# Patient Record
Sex: FEMALE | Race: WHITE | Employment: UNEMPLOYED | URBAN - METROPOLITAN AREA
[De-identification: names, ages, dates, MRNs, and addresses within clinical notes are randomized per-mention and may not be internally consistent; named-entity substitution may affect disease eponyms.]

---

## 2017-01-30 ENCOUNTER — HOSPITAL ENCOUNTER (OUTPATIENT)
Dept: DIABETES SERVICES | Age: 52
Discharge: HOME | End: 2017-01-30
Attending: FAMILY MEDICINE
Payer: COMMERCIAL

## 2017-01-30 DIAGNOSIS — E11.9 DIABETES MELLITUS WITHOUT COMPLICATION (HCC): ICD-10-CM

## 2017-01-30 PROCEDURE — G0109 DIAB MANAGE TRN IND/GROUP: HCPCS | Performed by: DIETITIAN, REGISTERED

## 2017-03-13 ENCOUNTER — HOSPITAL ENCOUNTER (OUTPATIENT)
Dept: DIABETES SERVICES | Age: 52
Discharge: HOME | End: 2017-03-13
Payer: COMMERCIAL

## 2017-03-13 DIAGNOSIS — E11.9 DIABETES MELLITUS WITHOUT COMPLICATION (HCC): ICD-10-CM

## 2017-03-13 PROCEDURE — G0108 DIAB MANAGE TRN  PER INDIV: HCPCS | Performed by: DIETITIAN, REGISTERED

## 2018-08-20 ENCOUNTER — APPOINTMENT (OUTPATIENT)
Dept: PHYSICAL THERAPY | Age: 53
End: 2018-08-20

## 2018-10-30 ENCOUNTER — APPOINTMENT (OUTPATIENT)
Dept: GENERAL RADIOLOGY | Age: 53
End: 2018-10-30
Attending: PHYSICIAN ASSISTANT
Payer: COMMERCIAL

## 2018-10-30 ENCOUNTER — APPOINTMENT (OUTPATIENT)
Dept: CT IMAGING | Age: 53
End: 2018-10-30
Attending: PHYSICIAN ASSISTANT
Payer: COMMERCIAL

## 2018-10-30 ENCOUNTER — HOSPITAL ENCOUNTER (EMERGENCY)
Age: 53
Discharge: HOME | End: 2018-10-30
Attending: EMERGENCY MEDICINE
Payer: COMMERCIAL

## 2018-10-30 VITALS
RESPIRATION RATE: 19 BRPM | HEART RATE: 83 BPM | DIASTOLIC BLOOD PRESSURE: 75 MMHG | TEMPERATURE: 99.1 F | HEIGHT: 62 IN | SYSTOLIC BLOOD PRESSURE: 114 MMHG | OXYGEN SATURATION: 95 % | WEIGHT: 206 LBS | BODY MASS INDEX: 37.91 KG/M2
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
## 2018-10-30 LAB
ANION GAP BLD CALC-SCNC: 17 MMOL/L (ref 10–20)
BUN BLD-MCNC: 16 MG/DL (ref 9–20)
CA-I BLD-MCNC: 1.04 MMOL/L (ref 1.12–1.32)
CHLORIDE BLD-SCNC: 105 MMOL/L (ref 98–107)
CO2 BLD-SCNC: 23 MMOL/L (ref 21–32)
CREAT BLD-MCNC: 0.5 MG/DL (ref 0.6–1.3)
GLUCOSE BLD-MCNC: 133 MG/DL (ref 65–100)
HCT VFR BLD CALC: 38 % (ref 35–47)
POTASSIUM BLD-SCNC: 3.7 MMOL/L (ref 3.5–5.1)
SERVICE CMNT-IMP: ABNORMAL
SODIUM BLD-SCNC: 140 MMOL/L (ref 136–145)

## 2018-10-30 PROCEDURE — 71275 CT ANGIOGRAPHY CHEST: CPT

## 2018-10-30 PROCEDURE — 80047 BASIC METABLC PNL IONIZED CA: CPT

## 2018-10-30 PROCEDURE — 74011636320 HC RX REV CODE- 636/320: Performed by: EMERGENCY MEDICINE

## 2018-10-30 PROCEDURE — 73130 X-RAY EXAM OF HAND: CPT

## 2018-10-30 PROCEDURE — 74011250637 HC RX REV CODE- 250/637: Performed by: PHYSICIAN ASSISTANT

## 2018-10-30 PROCEDURE — 99285 EMERGENCY DEPT VISIT HI MDM: CPT

## 2018-10-30 PROCEDURE — 71046 X-RAY EXAM CHEST 2 VIEWS: CPT

## 2018-10-30 PROCEDURE — 72125 CT NECK SPINE W/O DYE: CPT

## 2018-10-30 RX ORDER — TRAMADOL HYDROCHLORIDE 50 MG/1
50 TABLET ORAL
Status: COMPLETED | OUTPATIENT
Start: 2018-10-30 | End: 2018-10-30

## 2018-10-30 RX ORDER — IBUPROFEN 800 MG/1
800 TABLET ORAL
Qty: 20 TAB | Refills: 0 | Status: SHIPPED | OUTPATIENT
Start: 2018-10-30 | End: 2018-11-06

## 2018-10-30 RX ORDER — CYCLOBENZAPRINE HCL 10 MG
10 TABLET ORAL
Qty: 10 TAB | Refills: 0 | Status: SHIPPED | OUTPATIENT
Start: 2018-10-30 | End: 2019-02-07

## 2018-10-30 RX ADMIN — TRAMADOL HYDROCHLORIDE 50 MG: 50 TABLET, FILM COATED ORAL at 17:11

## 2018-10-30 RX ADMIN — IOPAMIDOL 100 ML: 755 INJECTION, SOLUTION INTRAVENOUS at 20:35

## 2018-10-30 NOTE — LETTER
[address] 
[facility] EMERGENCY DEPT 
[address] [facility] [address] [id number] [phone] Work/School Note Date: 10/30/2018 To Whom It May concern: 
 
Elizabeth Gardner was seen and treated today in the emergency room by the following provider(s): 
Attending Provider: Vickey Wilson MD 
Physician Assistant: NOE Mishra. Elizabeth Gardner may return to work on 11/2/18.  
 
Sincerely, 
 
 
 
 
NOE Serrano
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
## 2019-02-07 RX ORDER — BLACK COHOSH ROOT 540 MG
CAPSULE ORAL
COMMUNITY

## 2019-02-07 RX ORDER — FEXOFENADINE HCL AND PSEUDOEPHEDRINE HCI 180; 240 MG/1; MG/1
1 TABLET, EXTENDED RELEASE ORAL DAILY
COMMUNITY

## 2019-02-07 RX ORDER — LISINOPRIL 10 MG/1
10 TABLET ORAL DAILY
COMMUNITY

## 2019-02-07 RX ORDER — DULOXETIN HYDROCHLORIDE 60 MG/1
60 CAPSULE, DELAYED RELEASE ORAL DAILY
COMMUNITY

## 2019-02-07 NOTE — PERIOP NOTES
Preop phone call completed. Preop instructions and preop medications reveiwed with patient. Pre-Operative Instructions DO NOT EAT OR DRINK ANYTHING AFTER MIDNIGHT THE NIGHT BEFORE SURGERY.

## 2019-02-18 ENCOUNTER — ANESTHESIA (OUTPATIENT)
Dept: SURGERY | Age: 54
End: 2019-02-18
Payer: COMMERCIAL

## 2019-02-18 ENCOUNTER — ANESTHESIA EVENT (OUTPATIENT)
Dept: SURGERY | Age: 54
End: 2019-02-18
Payer: COMMERCIAL

## 2019-02-18 ENCOUNTER — HOSPITAL ENCOUNTER (OUTPATIENT)
Age: 54
Setting detail: OUTPATIENT SURGERY
Discharge: HOME | End: 2019-02-18
Attending: OTOLARYNGOLOGY | Admitting: OTOLARYNGOLOGY
Payer: COMMERCIAL

## 2019-02-18 VITALS
SYSTOLIC BLOOD PRESSURE: 140 MMHG | DIASTOLIC BLOOD PRESSURE: 83 MMHG | BODY MASS INDEX: 40.12 KG/M2 | RESPIRATION RATE: 17 BRPM | OXYGEN SATURATION: 98 % | WEIGHT: 218 LBS | HEIGHT: 62 IN | HEART RATE: 92 BPM | TEMPERATURE: 97.8 F

## 2019-02-18 DIAGNOSIS — J34.2 NASAL SEPTAL DEVIATION: Primary | ICD-10-CM

## 2019-02-18 LAB
GLUCOSE BLD STRIP.AUTO-MCNC: 120 MG/DL (ref 65–100)
GLUCOSE BLD STRIP.AUTO-MCNC: 126 MG/DL (ref 65–100)
SERVICE CMNT-IMP: ABNORMAL
SERVICE CMNT-IMP: ABNORMAL

## 2019-02-18 PROCEDURE — 77030018836 HC SOL IRR NACL ICUM -A: Performed by: OTOLARYNGOLOGY

## 2019-02-18 PROCEDURE — 74011250636 HC RX REV CODE- 250/636

## 2019-02-18 PROCEDURE — 76010000149 HC OR TIME 1 TO 1.5 HR: Performed by: OTOLARYNGOLOGY

## 2019-02-18 PROCEDURE — 77030002888 HC SUT CHRMC J&J -A: Performed by: OTOLARYNGOLOGY

## 2019-02-18 PROCEDURE — 82962 GLUCOSE BLOOD TEST: CPT

## 2019-02-18 PROCEDURE — 76210000021 HC REC RM PH II 0.5 TO 1 HR: Performed by: OTOLARYNGOLOGY

## 2019-02-18 PROCEDURE — 77030006907 HC BLD SNUS SHV MEDT -C: Performed by: OTOLARYNGOLOGY

## 2019-02-18 PROCEDURE — 77030008684 HC TU ET CUF COVD -B: Performed by: ANESTHESIOLOGY

## 2019-02-18 PROCEDURE — 76060000034 HC ANESTHESIA 1.5 TO 2 HR: Performed by: OTOLARYNGOLOGY

## 2019-02-18 PROCEDURE — 74011250636 HC RX REV CODE- 250/636: Performed by: OTOLARYNGOLOGY

## 2019-02-18 PROCEDURE — 77030002974 HC SUT PLN J&J -A: Performed by: OTOLARYNGOLOGY

## 2019-02-18 PROCEDURE — 74011250637 HC RX REV CODE- 250/637: Performed by: OTOLARYNGOLOGY

## 2019-02-18 PROCEDURE — 76210000016 HC OR PH I REC 1 TO 1.5 HR: Performed by: OTOLARYNGOLOGY

## 2019-02-18 PROCEDURE — 77030014491 HC PLEDG PTFE BARD -A: Performed by: OTOLARYNGOLOGY

## 2019-02-18 PROCEDURE — 77030032490 HC SLV COMPR SCD KNE COVD -B: Performed by: OTOLARYNGOLOGY

## 2019-02-18 PROCEDURE — 74011250636 HC RX REV CODE- 250/636: Performed by: ANESTHESIOLOGY

## 2019-02-18 PROCEDURE — 74011000250 HC RX REV CODE- 250: Performed by: OTOLARYNGOLOGY

## 2019-02-18 PROCEDURE — 77030020782 HC GWN BAIR PAWS FLX 3M -B

## 2019-02-18 PROCEDURE — 74011000250 HC RX REV CODE- 250

## 2019-02-18 PROCEDURE — 77030026438 HC STYL ET INTUB CARD -A: Performed by: NURSE ANESTHETIST, CERTIFIED REGISTERED

## 2019-02-18 PROCEDURE — 74011250637 HC RX REV CODE- 250/637: Performed by: ANESTHESIOLOGY

## 2019-02-18 RX ORDER — SODIUM CHLORIDE 0.9 % (FLUSH) 0.9 %
5-40 SYRINGE (ML) INJECTION EVERY 8 HOURS
Status: CANCELLED | OUTPATIENT
Start: 2019-02-18

## 2019-02-18 RX ORDER — MORPHINE SULFATE 10 MG/ML
2 INJECTION, SOLUTION INTRAMUSCULAR; INTRAVENOUS
Status: DISCONTINUED | OUTPATIENT
Start: 2019-02-18 | End: 2019-02-18 | Stop reason: HOSPADM

## 2019-02-18 RX ORDER — OXYMETAZOLINE HCL 0.05 %
2 SPRAY, NON-AEROSOL (ML) NASAL
Status: DISCONTINUED | OUTPATIENT
Start: 2019-02-18 | End: 2019-02-18 | Stop reason: HOSPADM

## 2019-02-18 RX ORDER — ONDANSETRON 8 MG/1
8 TABLET, ORALLY DISINTEGRATING ORAL
Qty: 5 TAB | Refills: 1 | Status: SHIPPED | OUTPATIENT
Start: 2019-02-18

## 2019-02-18 RX ORDER — LIDOCAINE HYDROCHLORIDE AND EPINEPHRINE 10; 10 MG/ML; UG/ML
INJECTION, SOLUTION INFILTRATION; PERINEURAL AS NEEDED
Status: DISCONTINUED | OUTPATIENT
Start: 2019-02-18 | End: 2019-02-18 | Stop reason: HOSPADM

## 2019-02-18 RX ORDER — SODIUM CHLORIDE 0.9 % (FLUSH) 0.9 %
5-40 SYRINGE (ML) INJECTION EVERY 8 HOURS
Status: DISCONTINUED | OUTPATIENT
Start: 2019-02-18 | End: 2019-02-18 | Stop reason: HOSPADM

## 2019-02-18 RX ORDER — OXYCODONE AND ACETAMINOPHEN 5; 325 MG/1; MG/1
1 TABLET ORAL AS NEEDED
Status: DISCONTINUED | OUTPATIENT
Start: 2019-02-18 | End: 2019-02-18 | Stop reason: HOSPADM

## 2019-02-18 RX ORDER — CEPHALEXIN 500 MG/1
500 CAPSULE ORAL 3 TIMES DAILY
Qty: 21 CAP | Refills: 0 | Status: SHIPPED | OUTPATIENT
Start: 2019-02-18 | End: 2019-02-25

## 2019-02-18 RX ORDER — MIDAZOLAM HYDROCHLORIDE 1 MG/ML
1 INJECTION, SOLUTION INTRAMUSCULAR; INTRAVENOUS AS NEEDED
Status: DISCONTINUED | OUTPATIENT
Start: 2019-02-18 | End: 2019-02-18 | Stop reason: HOSPADM

## 2019-02-18 RX ORDER — MORPHINE SULFATE 2 MG/ML
2 INJECTION, SOLUTION INTRAMUSCULAR; INTRAVENOUS
Status: CANCELLED | OUTPATIENT
Start: 2019-02-18

## 2019-02-18 RX ORDER — PROPOFOL 10 MG/ML
INJECTION, EMULSION INTRAVENOUS AS NEEDED
Status: DISCONTINUED | OUTPATIENT
Start: 2019-02-18 | End: 2019-02-18 | Stop reason: HOSPADM

## 2019-02-18 RX ORDER — OXYCODONE AND ACETAMINOPHEN 5; 325 MG/1; MG/1
1 TABLET ORAL
Status: CANCELLED | OUTPATIENT
Start: 2019-02-18

## 2019-02-18 RX ORDER — OXYCODONE AND ACETAMINOPHEN 5; 325 MG/1; MG/1
2 TABLET ORAL
Qty: 30 TAB | Refills: 0 | Status: SHIPPED | OUTPATIENT
Start: 2019-02-18

## 2019-02-18 RX ORDER — SODIUM CHLORIDE 0.9 % (FLUSH) 0.9 %
5-40 SYRINGE (ML) INJECTION AS NEEDED
Status: DISCONTINUED | OUTPATIENT
Start: 2019-02-18 | End: 2019-02-18 | Stop reason: HOSPADM

## 2019-02-18 RX ORDER — SODIUM CHLORIDE 9 MG/ML
50 INJECTION, SOLUTION INTRAVENOUS CONTINUOUS
Status: DISCONTINUED | OUTPATIENT
Start: 2019-02-18 | End: 2019-02-18 | Stop reason: HOSPADM

## 2019-02-18 RX ORDER — ONDANSETRON 2 MG/ML
4 INJECTION INTRAMUSCULAR; INTRAVENOUS
Status: CANCELLED | OUTPATIENT
Start: 2019-02-18

## 2019-02-18 RX ORDER — SODIUM CHLORIDE 9 MG/ML
1000 INJECTION, SOLUTION INTRAVENOUS CONTINUOUS
Status: DISCONTINUED | OUTPATIENT
Start: 2019-02-18 | End: 2019-02-18 | Stop reason: HOSPADM

## 2019-02-18 RX ORDER — FENTANYL CITRATE 50 UG/ML
25 INJECTION, SOLUTION INTRAMUSCULAR; INTRAVENOUS
Status: COMPLETED | OUTPATIENT
Start: 2019-02-18 | End: 2019-02-18

## 2019-02-18 RX ORDER — ONDANSETRON 2 MG/ML
INJECTION INTRAMUSCULAR; INTRAVENOUS AS NEEDED
Status: DISCONTINUED | OUTPATIENT
Start: 2019-02-18 | End: 2019-02-18 | Stop reason: HOSPADM

## 2019-02-18 RX ORDER — SODIUM CHLORIDE, SODIUM LACTATE, POTASSIUM CHLORIDE, CALCIUM CHLORIDE 600; 310; 30; 20 MG/100ML; MG/100ML; MG/100ML; MG/100ML
125 INJECTION, SOLUTION INTRAVENOUS CONTINUOUS
Status: DISCONTINUED | OUTPATIENT
Start: 2019-02-18 | End: 2019-02-18 | Stop reason: HOSPADM

## 2019-02-18 RX ORDER — MIDAZOLAM HYDROCHLORIDE 1 MG/ML
INJECTION, SOLUTION INTRAMUSCULAR; INTRAVENOUS AS NEEDED
Status: DISCONTINUED | OUTPATIENT
Start: 2019-02-18 | End: 2019-02-18 | Stop reason: HOSPADM

## 2019-02-18 RX ORDER — CEFAZOLIN SODIUM/WATER 2 G/20 ML
2 SYRINGE (ML) INTRAVENOUS ONCE
Status: COMPLETED | OUTPATIENT
Start: 2019-02-18 | End: 2019-02-18

## 2019-02-18 RX ORDER — FENTANYL CITRATE 50 UG/ML
50 INJECTION, SOLUTION INTRAMUSCULAR; INTRAVENOUS AS NEEDED
Status: DISCONTINUED | OUTPATIENT
Start: 2019-02-18 | End: 2019-02-18 | Stop reason: HOSPADM

## 2019-02-18 RX ORDER — FENTANYL CITRATE 50 UG/ML
INJECTION, SOLUTION INTRAMUSCULAR; INTRAVENOUS AS NEEDED
Status: DISCONTINUED | OUTPATIENT
Start: 2019-02-18 | End: 2019-02-18 | Stop reason: HOSPADM

## 2019-02-18 RX ORDER — LIDOCAINE HYDROCHLORIDE 20 MG/ML
INJECTION, SOLUTION EPIDURAL; INFILTRATION; INTRACAUDAL; PERINEURAL AS NEEDED
Status: DISCONTINUED | OUTPATIENT
Start: 2019-02-18 | End: 2019-02-18 | Stop reason: HOSPADM

## 2019-02-18 RX ORDER — GLYCOPYRROLATE 0.2 MG/ML
INJECTION INTRAMUSCULAR; INTRAVENOUS AS NEEDED
Status: DISCONTINUED | OUTPATIENT
Start: 2019-02-18 | End: 2019-02-18 | Stop reason: HOSPADM

## 2019-02-18 RX ORDER — LIDOCAINE HYDROCHLORIDE 10 MG/ML
0.1 INJECTION, SOLUTION EPIDURAL; INFILTRATION; INTRACAUDAL; PERINEURAL AS NEEDED
Status: DISCONTINUED | OUTPATIENT
Start: 2019-02-18 | End: 2019-02-18 | Stop reason: HOSPADM

## 2019-02-18 RX ORDER — MIDAZOLAM HYDROCHLORIDE 1 MG/ML
0.5 INJECTION, SOLUTION INTRAMUSCULAR; INTRAVENOUS
Status: DISCONTINUED | OUTPATIENT
Start: 2019-02-18 | End: 2019-02-18 | Stop reason: HOSPADM

## 2019-02-18 RX ORDER — ROCURONIUM BROMIDE 10 MG/ML
INJECTION, SOLUTION INTRAVENOUS AS NEEDED
Status: DISCONTINUED | OUTPATIENT
Start: 2019-02-18 | End: 2019-02-18 | Stop reason: HOSPADM

## 2019-02-18 RX ORDER — DEXAMETHASONE SODIUM PHOSPHATE 4 MG/ML
INJECTION, SOLUTION INTRA-ARTICULAR; INTRALESIONAL; INTRAMUSCULAR; INTRAVENOUS; SOFT TISSUE AS NEEDED
Status: DISCONTINUED | OUTPATIENT
Start: 2019-02-18 | End: 2019-02-18 | Stop reason: HOSPADM

## 2019-02-18 RX ORDER — ESMOLOL HYDROCHLORIDE 10 MG/ML
INJECTION INTRAVENOUS AS NEEDED
Status: DISCONTINUED | OUTPATIENT
Start: 2019-02-18 | End: 2019-02-18 | Stop reason: HOSPADM

## 2019-02-18 RX ORDER — SODIUM CHLORIDE, SODIUM LACTATE, POTASSIUM CHLORIDE, CALCIUM CHLORIDE 600; 310; 30; 20 MG/100ML; MG/100ML; MG/100ML; MG/100ML
INJECTION, SOLUTION INTRAVENOUS
Status: DISCONTINUED | OUTPATIENT
Start: 2019-02-18 | End: 2019-02-18 | Stop reason: HOSPADM

## 2019-02-18 RX ORDER — NEOSTIGMINE METHYLSULFATE 1 MG/ML
INJECTION INTRAVENOUS AS NEEDED
Status: DISCONTINUED | OUTPATIENT
Start: 2019-02-18 | End: 2019-02-18 | Stop reason: HOSPADM

## 2019-02-18 RX ORDER — BACITRACIN 500 [USP'U]/G
OINTMENT TOPICAL AS NEEDED
Status: DISCONTINUED | OUTPATIENT
Start: 2019-02-18 | End: 2019-02-18 | Stop reason: HOSPADM

## 2019-02-18 RX ORDER — ONDANSETRON 2 MG/ML
4 INJECTION INTRAMUSCULAR; INTRAVENOUS AS NEEDED
Status: DISCONTINUED | OUTPATIENT
Start: 2019-02-18 | End: 2019-02-18 | Stop reason: HOSPADM

## 2019-02-18 RX ORDER — SODIUM CHLORIDE 0.9 % (FLUSH) 0.9 %
5-40 SYRINGE (ML) INJECTION AS NEEDED
Status: CANCELLED | OUTPATIENT
Start: 2019-02-18

## 2019-02-18 RX ADMIN — FENTANYL CITRATE 25 MCG: 50 INJECTION INTRAMUSCULAR; INTRAVENOUS at 13:00

## 2019-02-18 RX ADMIN — OXYMETAZOLINE HCL 2 SPRAY: 0.05 SPRAY NASAL at 10:46

## 2019-02-18 RX ADMIN — Medication 2 G: at 11:26

## 2019-02-18 RX ADMIN — ESMOLOL HYDROCHLORIDE 20 MG: 10 INJECTION INTRAVENOUS at 12:28

## 2019-02-18 RX ADMIN — FENTANYL CITRATE 25 MCG: 50 INJECTION INTRAMUSCULAR; INTRAVENOUS at 13:40

## 2019-02-18 RX ADMIN — ONDANSETRON 4 MG: 2 INJECTION INTRAMUSCULAR; INTRAVENOUS at 11:39

## 2019-02-18 RX ADMIN — FENTANYL CITRATE 100 MCG: 50 INJECTION, SOLUTION INTRAMUSCULAR; INTRAVENOUS at 11:18

## 2019-02-18 RX ADMIN — NEOSTIGMINE METHYLSULFATE 2.5 MG: 1 INJECTION INTRAVENOUS at 12:23

## 2019-02-18 RX ADMIN — FENTANYL CITRATE 25 MCG: 50 INJECTION INTRAMUSCULAR; INTRAVENOUS at 12:55

## 2019-02-18 RX ADMIN — SODIUM CHLORIDE, SODIUM LACTATE, POTASSIUM CHLORIDE, AND CALCIUM CHLORIDE 125 ML/HR: 600; 310; 30; 20 INJECTION, SOLUTION INTRAVENOUS at 10:07

## 2019-02-18 RX ADMIN — GLYCOPYRROLATE 0.4 MG: 0.2 INJECTION INTRAMUSCULAR; INTRAVENOUS at 12:23

## 2019-02-18 RX ADMIN — SODIUM CHLORIDE, SODIUM LACTATE, POTASSIUM CHLORIDE, CALCIUM CHLORIDE: 600; 310; 30; 20 INJECTION, SOLUTION INTRAVENOUS at 11:05

## 2019-02-18 RX ADMIN — ROCURONIUM BROMIDE 30 MG: 10 INJECTION, SOLUTION INTRAVENOUS at 11:18

## 2019-02-18 RX ADMIN — MIDAZOLAM HYDROCHLORIDE 2 MG: 1 INJECTION, SOLUTION INTRAMUSCULAR; INTRAVENOUS at 11:05

## 2019-02-18 RX ADMIN — FENTANYL CITRATE 25 MCG: 50 INJECTION INTRAMUSCULAR; INTRAVENOUS at 13:25

## 2019-02-18 RX ADMIN — PROPOFOL 200 MG: 10 INJECTION, EMULSION INTRAVENOUS at 11:18

## 2019-02-18 RX ADMIN — DEXAMETHASONE SODIUM PHOSPHATE 4 MG: 4 INJECTION, SOLUTION INTRA-ARTICULAR; INTRALESIONAL; INTRAMUSCULAR; INTRAVENOUS; SOFT TISSUE at 11:39

## 2019-02-18 RX ADMIN — LIDOCAINE HYDROCHLORIDE 70 MG: 20 INJECTION, SOLUTION EPIDURAL; INFILTRATION; INTRACAUDAL; PERINEURAL at 11:18

## 2019-02-18 RX ADMIN — OXYCODONE AND ACETAMINOPHEN 1 TABLET: 5; 325 TABLET ORAL at 13:50

## 2019-02-18 NOTE — DISCHARGE INSTRUCTIONS
Patient Education   Patient Education     **YOU HAD ONE PERCOCET (FOR PAIN) AT 1:50PM    Nasal Septum Repair: What to Expect at [address] may have some swelling of your nose, upper lip, cheeks, or around your eyes after nasal surgery. You may have some bruises around your nose and eyes. Your nose may be sore and will bleed. This may last for several days after surgery. The tip of your nose and your upper lip and gums may be numb. Feeling will return in a few weeks to a few months. Your sense of smell may not be as good after surgery. But it will improve and will often return to normal in 1 to 2 months. You will have a drip pad under your nose to collect mucus and blood. Change it only when it bleeds through. You may have to do this every hour for 24 hours after surgery. You will probably be able to return to work or school in a few days and to your normal routine in about 3 weeks. But this varies with your job and how much surgery you had. Most people recover fully in 1 to 2 months. You will have to visit your doctor during the 3 to 4 months after your surgery. Your doctor will check to see that your nose is healing well. This care sheet gives you a general idea about how long it will take for you to recover. But each person recovers at a different pace. Follow the steps below to get better as quickly as possible. How can you care for yourself at home? Activity    · Rest when you feel tired. Getting enough sleep will help you recover. Do not lie flat. Raise your head with two or three pillows. This can reduce swelling. Try to sleep on your back for the month after surgery. You can also sleep in a reclining chair.     · Try to walk each day. Start by walking a little more than you did the day before. Bit by bit, increase the amount you walk. Walking boosts blood flow and helps prevent pneumonia and constipation.  Also, try to sit and stand as much as you can.     · For 1 week, try not to bend over or lift anything heavier than 10 pounds. This may include a child, heavy grocery bags and milk containers, a heavy briefcase or backpack, cat litter or dog food bags, or a vacuum .     · You can take a shower or bath. Avoid swimming for 6 weeks.     · Avoid strenuous activities, such as bicycle riding, jogging, weight lifting, or aerobic exercise, for 1 week or until your doctor says it is okay.     · You may drive when you are no longer taking prescription pain pills and feel up to it. Diet    · You can eat your normal diet. If your stomach is upset, try bland, low-fat foods like plain rice, broiled chicken, toast, and yogurt.     · You may notice that your bowel movements are not regular right after your surgery. This is common. Try to avoid constipation and straining with bowel movements. You may want to take a fiber supplement every day. If you have not had a bowel movement after a couple of days, ask your doctor about taking a mild laxative. Medicines    · Your doctor will tell you if and when you can restart your medicines. He or she will also give you instructions about taking any new medicines.     · If you take blood thinners, such as warfarin (Coumadin), clopidogrel (Plavix), or aspirin, be sure to talk to your doctor. He or she will tell you if and when to start taking those medicines again. Make sure that you understand exactly what your doctor wants you to do.     · Do not take aspirin, aspirin-containing medicines, or anti-inflammatory medicines such as ibuprofen (Advil, Motrin) or naproxen (Aleve) for 3 weeks following surgery unless your doctor says it is okay.     · Take pain medicines exactly as directed. ? If the doctor gave you a prescription medicine for pain, take it as prescribed. ? Do not take two or more pain medicines at the same time unless the doctor told you to. Many pain medicines have acetaminophen, which is Tylenol. Too much acetaminophen (Tylenol) can be harmful.   · If your doctor prescribed antibiotics, take them as directed. Do not stop taking them just because you feel better. You need to take the full course of antibiotics.     · If you think your pain medicine is making you sick to your stomach:  ? Take your medicine after meals (unless your doctor has told you not to). ? Ask your doctor for a different pain medicine. Incision care    · You will have a drip pad under your nose to collect blood. Change it only when it has bled through. You may have to do this every hour for 24 hours after surgery. When bleeding stops, you can remove it.     · If you have packing in your nose, leave it in. Your doctor will take it out. Ice and elevation    · To help with swelling and pain, put ice or a cold pack on your nose for 10 to 20 minutes at a time. Put a thin cloth between the ice and your skin.     · Sleep with your head raised up. You can also sleep in a reclining chair. Other instructions    · Do not blow your nose for 1 week after surgery.     · Do not put anything into your nose.     · If you must sneeze, open your mouth and sneeze naturally.     · After any packing is removed, use saline (saltwater) nasal washes to help keep your nasal passages open and wash out mucus and bacteria. You can buy saline nose drops at a grocery store or drugstore. Or you can make your own at home by adding 1 teaspoon of salt and 1 teaspoon of baking soda to 2 cups of distilled water. If you make your own, fill a bulb syringe with the solution, insert the tip into your nostril, and squeeze gently. Verlee Leader your nose.     · You can wear your glasses when you wish. Do not wear contacts until the day after the surgery. Follow-up care is a key part of your treatment and safety. Be sure to make and go to all appointments, and call your doctor if you are having problems. It's also a good idea to know your test results and keep a list of the medicines you take.   When should you call for help?  Call 911 anytime you think you may need emergency care. For example, call if:    · You passed out (lost consciousness).     · You have severe trouble breathing.    Call your doctor now or seek immediate medical care if:    · You have bleeding through the nasal packing that is not slowing.     · You have symptoms of infection, such as:  ? Increased pain, swelling, warmth, or redness. ? Red streaks coming from the area. ? Pus draining from the area. ? A fever.     · You have pain that does not get better after you take pain pills.    Watch closely for any changes in your health, and be sure to contact your doctor if:    · You do not get better as expected. Where can you learn more? Go to http://sohail-derek.info/. Enter P077 in the search box to learn more about \"Nasal Septum Repair: What to Expect at Home. \"  Current as of: March 27, 2018  Content Version: 11.9  © 9268-4211 [employer]. Care instructions adapted under license by [employer] (which disclaims liability or warranty for this information). If you have questions about a medical condition or this instruction, always ask your healthcare professional. [place] [address] any warranty or liability for your use of this information. Rhinoplasty: What to Expect at [address] [place] [address] is surgery to reshape your nose. It can be done to improve your appearance, fix a birth defect, or help you breathe better. You may have stitches or staples in your cuts (incisions). Stitches inside your nose and mouth will usually dissolve on their own. If you have staples, your doctor will take these out in the first week. A bandage will cover your nose. You may have a plastic or plaster splint to protect and help keep the new shape of your nose. You may have a \"nasal drip pad\" under your nostrils to collect any blood that may drip from your nose.  Your doctor will show you how to change the pad as needed. You may have packing material inside your nose to reduce bleeding and swelling. Packing and the nasal drip pad will be removed within 2 days after surgery. The splint will be removed in about a week. Your nose will be bruised and swollen, and you may get dark bruises around your eyes. The swelling may get worse before it gets better. Most of the swelling should go away in 3 to 4 weeks. You will have some pain in your nose, and you may have a headache. Your nose may be stuffy and you may have trouble breathing for a short time. The skin on the tip of your nose may be numb. You may have some itching or shooting pain as the feeling returns. If bones were broken during your surgery, you will need to avoid injury to your nose for about 3 months. In 3 to 4 weeks, you should have a good idea as to what your nose will look like. It can take up to a year to see the final result. This care sheet gives you a general idea about how long it will take for you to recover. But each person recovers at a different pace. Follow the steps below to feel better as quickly as possible. How can you care for yourself at home? Activity    · Rest when you feel tired. Getting enough sleep will help you recover.     · Keep your head raised for several days after surgery. Sleep with your head up by using 2 or 3 pillows.     · Try to walk each day. Start by walking a little more than you did the day before. Bit by bit, increase the amount you walk.     · You will probably need to take about 1 week off from work. It depends on the type of work you do and how you feel.     · Avoid strenuous activities, such as bicycle riding, jogging, weight lifting, or aerobic exercise, for 2 to 3 weeks or until your doctor says it is okay.     · Ask your doctor when you can drive again.     · Do not blow your nose for at least 1 week after surgery. Wipe your nose gently with a tissue.  If you need to sneeze, do it with your mouth open.     · Ask your doctor when it'll be okay for you to bend over.     · Do not rub your nose for 8 weeks. Use sunblock on your nose and wear a hat with a brim to avoid getting a sunburn. Put on sunblock or makeup gently.     · Do not swim for a week. Diet    · You may notice that your bowel movements are not regular right after your surgery. This is common. Try to avoid constipation and straining with bowel movements. You may want to take a fiber supplement every day. If you have not had a bowel movement after a couple of days, ask your doctor about taking a mild laxative. Medicines    · Your doctor will tell you if and when you can restart your medicines. He or she will also give you instructions about taking any new medicines.     · If you take blood thinners, such as warfarin (Coumadin), clopidogrel (Plavix), or aspirin, be sure to talk to your doctor. He or she will tell you if and when to start taking those medicines again. Make sure that you understand exactly what your doctor wants you to do.     · Be safe with medicines. Take pain medicines exactly as directed. ? If the doctor gave you a prescription medicine for pain, take it as prescribed. ? If you are not taking a prescription pain medicine, ask your doctor if you can take an over-the-counter medicine.     · If your doctor prescribed antibiotics, take them as directed. Do not stop taking them just because you feel better. You need to take the full course of antibiotics.     · If you think your pain medicine is making you sick to your stomach:  ? Take your medicine after meals (unless your doctor has told you not to). ? Ask your doctor for a different pain medicine. Incision care    · After the stitches or staples are out, you may wash the incision with soap and water and gently dry the area.     · If you have strips of tape on the incision the doctor made, leave the tape on for a week or until it falls off.  Or follow your doctor's instructions for removing the tape. Other instructions    · Put ice or a cold pack on your nose for 10 to 20 minutes at a time. Try to do this every 1 to 2 hours for the next 3 days (when you are awake) or until the swelling goes down. A bag of frozen peas or corn works well for this, because it molds to the shape of your face. Put a thin cloth between the ice and your skin.     · Do not set glasses on your nose for 4 weeks. Instead, wrap a piece of tape around the bridge of the glasses and attach the tape to your forehead.     · For 1 week, avoid wearing clothes that you pull over your head. Follow-up care is a key part of your treatment. Be sure to make and go to all appointments, and call your doctor if you are having problems. It's also a good idea to know your test results and keep a list of the medicines you take. When should you call for help? Call 911 anytime you think you may need emergency care. For example, call if:    · You passed out (lost consciousness).     · You have severe trouble breathing.     · You have sudden chest pain and shortness of breath, or you cough up blood.    Call your doctor now or seek immediate medical care if:    · You have pain that does not get better after you take pain medicine.     · You have symptoms of a blood clot in your leg (called a deep vein thrombosis), such as:  ? Pain in the calf, back of the knee, thigh, or groin. ? Redness and swelling in your leg or groin.     · You are bleeding from the incision.     · You have loose stitches, or your incision comes open.     · You have symptoms of infection, such as:  ? Increased pain, swelling, warmth, or redness. ? Red streaks leading from the incision. ? Pus draining from the incision. ? A fever.     · You are sick to your stomach or cannot keep fluids down.    Watch closely for any changes in your health, and be sure to contact your doctor if:    · You do not get better as expected. Where can you learn more?   Go to http://sohail-derek.info/. Enter O452 in the search box to learn more about \"Rhinoplasty: What to Expect at Home. \"  Current as of: April 17, 2018  Content Version: 11.9  © 9302-3335 [employer]. Care instructions adapted under license by [employer] (which disclaims liability or warranty for this information). If you have questions about a medical condition or this instruction, always ask your healthcare professional. [address] any warranty or liability for your use of this information. ______________________________________________________________________    Anesthesia Discharge Instructions    After general anesthesia or intervenous sedation, for 24 hours or while taking prescription Narcotics:  · Limit your activities  · Do not drive or operate hazardous machinery  · If you have not urinated within 8 hours after discharge, please contact your surgeon on call. · Do not make important personal or business decisions  · Do not drink alcoholic beverages    Report the following to your surgeon:  · Excessive pain, swelling, redness or odor of or around the surgical area  · Temperature over 100.5 degrees  · Nausea and vomiting lasting longer than 4 hours or if unable to take medication  · Any signs of decreased circulation or nerve impairment to extremity:  Change in color, persistent numbness, tingling, coldness or increased pain.   · Any questions

## 2019-02-18 NOTE — ANESTHESIA POSTPROCEDURE EVALUATION
Procedure(s): 
SEPTOPLASTY, GRAFT EAR CARTILAGE TO NOSE, REPAIR OF NASAL VESTIBULAR STENOSIS, RESECTION OF TURBINATE BONES. Anesthesia Post Evaluation Multimodal analgesia: multimodal analgesia used between 6 hours prior to anesthesia start to PACU discharge Patient location during evaluation: PACU Patient participation: complete - patient participated Level of consciousness: awake Pain score: 2 Pain management: adequate Airway patency: patent Anesthetic complications: no 
Cardiovascular status: acceptable Respiratory status: acceptable Hydration status: acceptable Comments: I have evaluated the patient and meets criteria for discharge from PACU. Kim Duarte MD 
Post anesthesia nausea and vomiting:  controlled Visit Vitals /83 Pulse 88 Temp 36.6 °C (97.8 °F) Resp 15 Ht 5' 2\" (1.575 m) Wt 98.9 kg (218 lb) SpO2 98% BMI 39.87 kg/m²

## 2019-02-18 NOTE — OP NOTES
OPERATIVE REPORT    NAME: Yoanna Mcfadden  MRN: [id number]  DATE: 2/18/2019    PREOPERATIVE DIAGNOSES:    1. Nasal valve collapse/nasal vestibular stenosis. 2. Septal deviation  3. Turbinate hypertrophy        POSTOPERATIVE DIAGNOSES:  1. Same      OPERATIVE PROCEDURE:  AQUIRED DEFORMITY OF THE NOSE  1. Functional nasal valve repair, external/internal nasal valves. 2. Ear cartilage, conchal graft harvest for nasal reconstruction. 3. Septoplasty  4. Turbinate reduction with turbinate outfracture      SURGEON: Adis Tabares MD    ANESTHESIA: General endotracheal tube anesthesia. COMPLICATIONS: None. INDICATIONS AND FINDINGS: The patient has been treated for several years  for problems with progressive nasal obstruction related to nasal side-wall  collapse where the upper lateral cartilages have been dislocated from the  caudal nasal bones, causing a flail or dynamic collapse of the internal  valve. At external valve level, nostrils completely collapse pinching off airway with 100% loss of airway. Apart from dynamic collapse as above, pt also has fixed obstruction with septal deviation and turbinate hypertrophy and hence indications. DESCRIPTION OF PROCEDURE:  In the operating room, the patient was induced under  general endotracheal  tube anesthesia, following which she was prepped and draped in a sterile  fashion. Right-sided conchal cartilage graft was harvesting, making an incision  inside the antihelix and elevating the rocky cavum and cymba, excising out  the entire bowl cartilage. Bovie cautery was used for hemostasis. The flap  was then relayed and closed with running 5-0 plain gut suture, using  mattress sutures in the cavum and cymba to close the dead space. Antibiotic  soaked ointment was then placed in each cavum and cymba hollow to aid  tamponade hemostasis.     The conchal cartilage was then split obliquely to obtain 2 strip grafts,  each about 3/4 of the length of the entire graft specimen. The graft was  then thinned and shaped and beveled, removing further attached  perichondrium. An 8 mm incision was made 1 mm above the ala groove on each side,  dissecting a subcutaneous pocket to the tip of nose over the dome  cartilage. This was done just bridging the scroll area at the ala groove  and pressing to the ala rim itself. Each conchal graft was then teased into  this pocket, fixing it with a mattress suture of Vicryl and tucking it into  the ala base deep and lateral over the sesamoid string of cartilages. The  incisions were closed with interrupted 5-0 plain gut suture. Mastisol and  Steri-Strip dressing was applied. Then, 1% lidocaine 1:100,000 part epinephrine was used for local  infiltration at all sites and infraorbital foramen blocks. Bilateral  intercartilaginous incisions were made, elevating a subnasal SMAS envelope  over the upper lateral cartilages and across the nasal dorsum, using a  Rufino  to elevate the subperiosteum over the caudal nasal bones. A left-sided [place] incision was made, elevating bilateral  submucoperichondrial leaflets, dissecting the concavity of the spur on the  right, incising the junction of the spur with the septum and peeling this  off the crest itself. Once accomplished, the leaflets could be peeled from  the sides of the crest, into the floor of the nose on both sides. The bony  cartilaginous junction was divided, and the deflected perpendicular plate  of the ethmoid incised high and low with Fomon scissors, removing this  deflected plate with duckbill forceps. The subluxed portion of septum off  the crest, bowing this because of excess height, was then resected, for a  jigsaw puzzle fit. An L-strut of greater than 1.5 cm was left, using the  remaining posterior septum as straight sheets for the valve grafts. This  was resected.  This cartilage was then shaped and beveled and morselized to  remove its memory and used to match the shape of the upper lateral shape  cartilages on both sides for later use. The septum was resected from the  nasal spine in part of the shortening to make it appropriate height,  sitting in the midline in an open hinged fashion. Straight plates of septal  cartilage and bone were then repositioned in the anatomic place and a  mattress suture used in a figure-of-eight fashion to fix the septum to the  midline of the crest and close the dead space of the leaflets. Overlay  denis-type grafts were then used from the septal cartilage, using straight plates of structurally redundant cartilage, thinning and bevelling and matching the shape of the ULCs. These were then placed overlying each upper lateral cartilage over the piriform aperture with overlap on the caudal bony edge  and mattressed to the upper lateral cartilages on both sides, anteriorly  tucked into the scroll region. Intercartilaginous incisions were then closed  with interrupted 5-0 plain gut sutures. Stab incisions made at the base of each inferior turbinate and submucosal  pocket were created medially and inferiorly, the length of each inferior  turbinate. On the right there was marked compensatory turbinate  hypertrophy. The turbinate blade of the microdebrider was then used to core  out the parenchyma of each inferior turbinate, preserving mucosa maximally. The bone was nibbled with some of this, but further addressed,  outfracturing each inferior turbinate with the [place] butter knife, to open  up the valve at this level. The hypopharynx was suctioned. Bilateral Marquez  splints were applied, secured to the membranous septum with a silk suture. Mastisol and Steri-Strip cast was applied, and the patient then handed over  to Anesthesia once the throat pack was removed, for awakening and transfer  to the recovery room.       Og Slater MD

## 2019-02-18 NOTE — ANESTHESIA PREPROCEDURE EVALUATION
Anesthetic History No history of anesthetic complications Review of Systems / Medical History Patient summary reviewed, nursing notes reviewed and pertinent labs reviewed Pulmonary Within defined limits Sleep apnea Neuro/Psych Within defined limits Psychiatric history Cardiovascular Within defined limits Hypertension GI/Hepatic/Renal 
Within defined limits Endo/Other Within defined limits Diabetes Morbid obesity Other Findings Physical Exam 
 
Airway Mallampati: II 
TM Distance: > 6 cm Neck ROM: normal range of motion Mouth opening: Normal 
 
 Cardiovascular Regular rate and rhythm,  S1 and S2 normal,  no murmur, click, rub, or gallop Dental 
No notable dental hx Pulmonary Breath sounds clear to auscultation Abdominal 
GI exam deferred Other Findings Anesthetic Plan ASA: 3 Anesthesia type: general 
 
 
 
 
Induction: Intravenous Anesthetic plan and risks discussed with: Patient

## 2019-03-07 ENCOUNTER — OFFICE VISIT (OUTPATIENT)
Dept: SLEEP MEDICINE | Age: 54
End: 2019-03-07

## 2019-03-07 VITALS
SYSTOLIC BLOOD PRESSURE: 111 MMHG | DIASTOLIC BLOOD PRESSURE: 76 MMHG | BODY MASS INDEX: 39.2 KG/M2 | WEIGHT: 213 LBS | HEIGHT: 62 IN | OXYGEN SATURATION: 96 % | RESPIRATION RATE: 16 BRPM | HEART RATE: 100 BPM

## 2019-03-07 DIAGNOSIS — G47.33 OSA (OBSTRUCTIVE SLEEP APNEA): Primary | ICD-10-CM

## 2019-03-07 NOTE — PROGRESS NOTES
[address] [place]. [place], [address]  Tel.  [phone]  Fax. [address] [facility] [address]  [place], [address] [facility]  Tel.  [phone]  Fax. [phone] [address] [facility] [place]   Tel.  [phone]  Fax. [phone]         Chief Complaint       Chief Complaint   Patient presents with    Sleep Problem         HPI        Janette Hudson is a  49 y.o.  female seen for follow-up. She has a history of obstructive sleep apnea diagnosed over 15 years ago for which she was started on CPAP; she was financially unable to continue. In 2007 she had lap band surgery and lost significant weight much of which she had regained. She reported ongoing problems with snoring, nonrestorative sleep and frequent awakening. She was reevaluated with a [facility]. Diagnostic study demonstrated an AHI of 43.1/h associated with minimal SaO2 of 84%. Events were more prominent supine with the supinerelated AHI of 78/h. Events were primarily hypopnea. During a second study, CPAP was employed at 10 cm, the AHI was 0/h and minimal SaO2 91%. She did have difficulties due to mask cushions responding poorly to use of liners. She had inquired about an oral appliance. She was reevaluated with a home sleep test.  Study was done during 2 nights. Initial respiratory event index was 26/h with minimal SaO2 77%; second night respiratory event index of 69/h with minimal SaO2 76%. Snoring was noted during the study. She is seen during that she had a recent ENT surgery. She is able to breathe normally through her nose. Compliance data downloaded and reviewed in detail with the patient today. During the past 30 days, CPAP used during 30 days with the average daily use of 8.6 hours. CMS compliance criteria 100%. AHI 4 per hour. She feels that she is having \"too much air\" since the ENT surgery.     Allergies   Allergen Reactions    Codeine Other (comments)     Headache    This is not a true allergy. I am going to remove from profile.  Nickel Itching    Penicillins Itching       Current Outpatient Medications   Medication Sig Dispense Refill    DULoxetine (CYMBALTA) 60 mg capsule Take 60 mg by mouth daily.  fexofenadine-pseudoephedrine (ALLEGRA-D 24 HOUR) 180-240 mg per tablet Take 1 Tab by mouth daily.  lisinopril (PRINIVIL, ZESTRIL) 10 mg tablet Take 10 mg by mouth daily.  metFORMIN (GLUCOPHAGE) 500 mg tablet Take 1,000 mg by mouth two (2) times daily (with meals). BREAKFAST  & NIGHT DINNER       multivitamin, tx-iron-ca-min (THERA-M W/ IRON) 9 mg iron-400 mcg tab tablet Take 1 Tab by mouth daily.  pravastatin (PRAVACHOL) 20 mg tablet Take 20 mg by mouth nightly.  oxyCODONE-acetaminophen (PERCOCET) 5-325 mg per tablet Take 2 Tabs by mouth every four (4) hours as needed for Pain. Max Daily Amount: 12 Tabs. 30 Tab 0    ondansetron (ZOFRAN ODT) 8 mg disintegrating tablet Take 1 Tab by mouth every eight (8) hours as needed for Nausea. 5 Tab 1    black cohosh 540 mg cap Take  by mouth. She  has a past medical history of DM type 2 (diabetes mellitus, type 2) ([address] Dyspepsia and other specified disorders of function of stomach, Hyperlipidemia, Hypertension, Morbid obesity ([address] MARY (obstructive sleep apnea), and Psychiatric disorder. She  has a past surgical history that includes hx  section; hx gi; hx other surgical (2007); hx other surgical (2016); and hx cholecystectomy (2017). She family history includes COPD in her mother and another family member; Cancer in her father; Diabetes in her father; Heart Disease in her father; High Cholesterol in her father; Hypertension in her father; Thyroid Disease in her mother. She  reports that she quit smoking about 20 years ago. She has a 10.00 pack-year smoking history.  she has never used smokeless tobacco. She reports that she does not drink alcohol or use drugs. Review of Systems:  Unchanged per patient      Objective:     Visit Vitals  /76 (BP 1 Location: Left arm, BP Patient Position: Sitting)   Pulse 100   Resp 16   Ht 5' 2\" (1.575 m)   Wt 213 lb (96.6 kg)   SpO2 96%   BMI 38.96 kg/m²     Body mass index is 38.96 kg/m². Assessment:       ICD-10-CM ICD-9-CM    1. MARY (obstructive sleep apnea) G47.33 327.23      Sleep disordered breathing responding to CPAP at 10 cm. She did not bring her CPAP mask with her. It is unclear whether her discomfort is secondary to the recent surgery. I have suggested that she bring her mass to the clinic and exposed to 5 cm CPAP. She may benefit from APAP as her unit can be upgraded. I would prefer that she obtain a new unit when she is no longer having surgery related discomfort. Plan:   No orders of the defined types were placed in this encounter. * Patient has a history and examination consistent with the diagnosis of sleep apnea. * She was provided information on sleep apnea including corresponding risk factors and the importance of proper treatment. * Treatment options if indicated were reviewed today. Potential benefit of weight reduction was discussed. Weight reduction techniques reviewed. Nic Fernandez MD, FAASM  Electronically signed 03/07/19        This note was created using voice recognition software. Despite editing, there may be syntax errors. This note will not be viewable in [address]

## 2019-03-07 NOTE — PATIENT INSTRUCTIONS

## 2019-03-18 ENCOUNTER — TELEPHONE (OUTPATIENT)
Dept: SLEEP MEDICINE | Age: 54
End: 2019-03-18

## 2019-03-18 NOTE — TELEPHONE ENCOUNTER
Patient walk-in requesting pressure adjustment to 7cm as 5cm felt too low. We will defer to Dr. Ramo Bach for recommendations.

## 2019-05-30 ENCOUNTER — TELEPHONE (OUTPATIENT)
Dept: SLEEP MEDICINE | Age: 54
End: 2019-05-30

## 2019-05-30 NOTE — TELEPHONE ENCOUNTER
Patient reports pressures on cpap is too low and would like to be increased. She states she had surgery and pressure was fine but not she feels as she is not getting enough rest. She is unable to come in to office for follow up visit as she just got a new job and to take time off is impossible. She spoke with [facility] and they will change her pressure for her but needs an order form Dr. Hailey Narayanan to do so.  Please advise or send order with pressure change

## 2019-06-14 ENCOUNTER — OFFICE VISIT (OUTPATIENT)
Dept: SLEEP MEDICINE | Age: 54
End: 2019-06-14

## 2019-06-14 DIAGNOSIS — G47.33 OSA (OBSTRUCTIVE SLEEP APNEA): Primary | ICD-10-CM

## 2019-06-14 NOTE — PROGRESS NOTES
[address] [facility] [place]. [place], [address]  Tel.  [phone]  Fax. 100 [facility] 60  [place], [address] [facility]  Tel.  [phone]  Fax. [phone] [address] [facility] [address]   Tel.  [phone]  Fax. [phone]       S>Yen Stuart is a 49 y.o. female seen for a positive airway pressure clinic. She reports minimal problems using the device. She is 100% compliant over the past 30 days. The following problems are identified:    Drowsiness yes Problems exhaling no   Snoring yes Forget to put on no   Mask Comfortable yes Can't fall asleep no   Dry Mouth no Mask falls off no   Air Leaking no Frequent awakenings no     She admits that her sleep has worsened. The PAP machine was downloaded and revealed:   AHI: 6.5   Leak: 0.4 lpm   Average Daily Usage: 8hr 3min    O>    There were no vitals taken for this visit. A>  No diagnosis found. AHI = 41. On CPAP :  7 cmH2O. Compliant:      yes    Therapeutic Response:  Positive    P>  * CPAP download reviewed with Dr. Jacqui Agosto. He reports elevated AHI and requests pressure increase per patient comfort. Patient was allowed time to acclimate to pressures from her current setting of 7cm up to 10cm. She was comfortable at all levels. Per MD order, pressure was set on her current CPAP device (ResMed S9) to CPAP 10cm. * Dr. Jacqui Agosto also recommends ordering a new APAP device. Order will be sent to DME and we will schedule 1st adherence visit. *She was asked to contact our office for any problems regarding her PAP therapy. * Counseling was provided regarding the importance of regular PAP use and on proper sleep hygiene and safe driving. * Re-enforced proper and regular cleaning for his device.

## 2019-06-28 ENCOUNTER — TELEPHONE (OUTPATIENT)
Dept: SLEEP MEDICINE | Age: 54
End: 2019-06-28

## 2019-06-28 ENCOUNTER — DOCUMENTATION ONLY (OUTPATIENT)
Dept: SLEEP MEDICINE | Age: 54
End: 2019-06-28

## 2019-06-28 DIAGNOSIS — G47.33 OSA (OBSTRUCTIVE SLEEP APNEA): Primary | ICD-10-CM

## 2019-06-28 NOTE — TELEPHONE ENCOUNTER
Patient called to follow up on her device order. She said she has followed up numerous times. She needs it by 7/1/19. She left voice message.

## 2019-06-28 NOTE — PROGRESS NOTES
DME [facility] explained to patient payment plans and it came out to be expensive, so patient did not want to go for it right now.

## 2019-06-28 NOTE — TELEPHONE ENCOUNTER
Download at 7 cm CPAP demonstrated AHI 6.5 per hour. Have generated order for APAP 7-10 cm. Patient to contact the office if she has difficulties tolerating pressure range.

## 2019-07-12 ENCOUNTER — HOSPITAL ENCOUNTER (OUTPATIENT)
Dept: GENERAL RADIOLOGY | Age: 54
Discharge: HOME | End: 2019-07-12
Attending: FAMILY MEDICINE
Payer: COMMERCIAL

## 2019-07-12 ENCOUNTER — HOSPITAL ENCOUNTER (OUTPATIENT)
Dept: MRI IMAGING | Age: 54
Discharge: HOME | End: 2019-07-12
Attending: FAMILY MEDICINE
Payer: COMMERCIAL

## 2019-07-12 DIAGNOSIS — S43.431A SUPERIOR GLENOID LABRUM LESION OF RIGHT SHOULDER: ICD-10-CM

## 2019-07-12 PROCEDURE — A9575 INJ GADOTERATE MEGLUMI 0.1ML: HCPCS | Performed by: RADIOLOGY

## 2019-07-12 PROCEDURE — 74011250636 HC RX REV CODE- 250/636: Performed by: RADIOLOGY

## 2019-07-12 PROCEDURE — 73222 MRI JOINT UPR EXTREM W/DYE: CPT

## 2019-07-12 PROCEDURE — 23350 INJECTION FOR SHOULDER X-RAY: CPT

## 2019-07-12 PROCEDURE — 74011636320 HC RX REV CODE- 636/320: Performed by: RADIOLOGY

## 2019-07-12 RX ORDER — GADOTERATE MEGLUMINE 376.9 MG/ML
2 INJECTION INTRAVENOUS
Status: COMPLETED | OUTPATIENT
Start: 2019-07-12 | End: 2019-07-12

## 2019-07-12 RX ORDER — LIDOCAINE HYDROCHLORIDE 10 MG/ML
5 INJECTION, SOLUTION EPIDURAL; INFILTRATION; INTRACAUDAL; PERINEURAL
Status: COMPLETED | OUTPATIENT
Start: 2019-07-12 | End: 2019-07-12

## 2019-07-12 RX ADMIN — GADOTERATE MEGLUMINE 2 ML: 376.9 INJECTION INTRAVENOUS at 14:47

## 2019-07-12 RX ADMIN — LIDOCAINE HYDROCHLORIDE 5 ML: 10 INJECTION, SOLUTION EPIDURAL; INFILTRATION; INTRACAUDAL; PERINEURAL at 14:46

## 2019-07-12 RX ADMIN — IOHEXOL 10 ML: 300 INJECTION, SOLUTION INTRAVENOUS at 14:47

## 2020-01-08 ENCOUNTER — DOCUMENTATION ONLY (OUTPATIENT)
Dept: SLEEP MEDICINE | Age: 55
End: 2020-01-08

## 2020-07-31 ENCOUNTER — TELEPHONE (OUTPATIENT)
Dept: SLEEP MEDICINE | Age: 55
End: 2020-07-31

## 2020-07-31 NOTE — TELEPHONE ENCOUNTER
Patient called and said the cpap she is using right now have stared giving messages like \"life of motor dying\". She said she has another machine as well, which was her dad's and dad never used it. She wants that machine to be set with needed pressure. I have scheduled tech visit for her to bring machine to be looked at/pressure change. SALVADOR.

## 2022-05-12 ENCOUNTER — OFFICE VISIT (OUTPATIENT)
Dept: SLEEP MEDICINE | Age: 57
End: 2022-05-12
Payer: COMMERCIAL

## 2022-05-12 VITALS
SYSTOLIC BLOOD PRESSURE: 125 MMHG | HEIGHT: 62 IN | OXYGEN SATURATION: 99 % | BODY MASS INDEX: 37.36 KG/M2 | HEART RATE: 99 BPM | DIASTOLIC BLOOD PRESSURE: 86 MMHG | WEIGHT: 203 LBS

## 2022-05-12 DIAGNOSIS — G47.33 OSA (OBSTRUCTIVE SLEEP APNEA): Primary | ICD-10-CM

## 2022-05-12 PROCEDURE — 99213 OFFICE O/P EST LOW 20 MIN: CPT | Performed by: SPECIALIST

## 2022-05-12 NOTE — PROGRESS NOTES
[address] [facility] [place]. [place], [address]  Tel.  [phone]  Fax. 100 [facility] 60  [place], [address] [facility]  Tel.  [phone]  Fax. [phone] [address] [facility] [address]   Tel.  [phone]  Fax. [phone]         Chief Complaint       Chief Complaint   Patient presents with    Sleep Problem     yearly PAP follow up         HPI        [place] is a 65 y.o. female seen for follow-up. She has a history of obstructive sleep apnea diagnosed over 15 years ago for which she was started on CPAP; she was financially unable to continue. In 2007 she had lap band surgery and lost significant weight much of which she had regained. She reported ongoing problems with snoring, nonrestorative sleep and frequent awakening.     She was reevaluated with a nocturnal [facility]. Diagnostic study demonstrated an AHI of 43.1/h associated with minimal SaO2 of 84%. Events were more prominent supine with the supinerelated AHI of 78/h. Events were primarily hypopnea.     During a second study, CPAP was employed at 10 cm, the AHI was 0/h and minimal SaO2 91%. Was previously seen 3/7/19. Patient is using S9 Elite device which had been her fathers. Compliance data downloaded and reviewed in detail with the patient today. During the past 30 days, CPAP used during 30 days with the average daily use of 9.2 hours. CMS compliance criteria 100%. AHI 0.7 per hour. Allergies   Allergen Reactions    Codeine Other (comments)     Headache    This is not a true allergy. I am going to remove from profile.  Nickel Itching    Penicillins Itching       Current Outpatient Medications   Medication Sig Dispense Refill    DULoxetine (CYMBALTA) 60 mg capsule Take 60 mg by mouth daily.  fexofenadine-pseudoephedrine (ALLEGRA-D 24 HOUR) 180-240 mg per tablet Take 1 Tab by mouth daily.       lisinopril (Carmela Resendiz) 10 mg tablet Take 10 mg by mouth daily.  metFORMIN (GLUCOPHAGE) 500 mg tablet Take 1,000 mg by mouth two (2) times daily (with meals). BREAKFAST  & NIGHT DINNER       multivitamin, tx-iron-ca-min (THERA-M W/ IRON) 9 mg iron-400 mcg tab tablet Take 1 Tab by mouth daily.  pravastatin (PRAVACHOL) 20 mg tablet Take 20 mg by mouth nightly.  oxyCODONE-acetaminophen (PERCOCET) 5-325 mg per tablet Take 2 Tabs by mouth every four (4) hours as needed for Pain. Max Daily Amount: 12 Tabs. 30 Tab 0    ondansetron (ZOFRAN ODT) 8 mg disintegrating tablet Take 1 Tab by mouth every eight (8) hours as needed for Nausea. 5 Tab 1    black cohosh 540 mg cap Take  by mouth. She  has a past medical history of DM type 2 (diabetes mellitus, type 2) ([address] Dyspepsia and other specified disorders of function of stomach, Hyperlipidemia, Hypertension, Morbid obesity ([facility] [address] MARY (obstructive sleep apnea), and Psychiatric disorder. She  has a past surgical history that includes hx  section; hx gi; hx other surgical (2007); hx other surgical (2016); and hx cholecystectomy (2017). She family history includes COPD in her mother and another family member; Cancer in her father; Diabetes in her father; Heart Disease in her father; High Cholesterol in her father; Hypertension in her father; Thyroid Disease in her mother. She  reports that she quit smoking about 23 years ago. She has a 10.00 pack-year smoking history. She has never used smokeless tobacco. She reports that she does not drink alcohol and does not use drugs. Review of Systems:  Unchanged per patient      Objective:     Visit Vitals  /86   Pulse 99   Ht 5' 2\" (1.575 m)   Wt 203 lb (92.1 kg)   SpO2 99%   BMI 37.13 kg/m²     Body mass index is 37.13 kg/m². General:   Conversant, cooperative   Eyes:  , no nystagmus                   CVS:  Normal rate,        Neuro:  Speech fluent             Assessment:       ICD-10-CM ICD-9-CM    1.  MARY (obstructive sleep apnea)  G47.33 327.23 AMB SUPPLY ORDER     History of severe sleep disordered breathing, responding to CPAP. She will continue with the current pressure settings. she is compliant with PAP therapy and PAP continues to benefit patient and remains necessary for control of her sleep apnea. Patient will soon be moving to [place]. She will establish with a sleep physician. Supplies will be updated prior to the move. Plan:     Orders Placed This Encounter    AMB SUPPLY ORDER     Diagnosis: Obstructive Sleep Apnea ICD-10 Code (G47.33)    CPAP mask and supplies-  Patient preference, headgear, heated tubing, and filter;  heated humidifier. Wireless modem. Remote monitoring enrollment.  Oral/Nasal Combo Mask 1 every 3 months.  Oral Cushion Combo Mask (Replace) 2 per month.  Nasal Pillows Combo Mask (Replace) 2 per month.  Full Face Mask 1 every 3 months.  Full Face Mask Cushion 1 per month.  Nasal Cushion (Replace) 2 per month.  Nasal Pillows (Replace) 2 per month.  Nasal Interface Mask 1 every 3 months.  Headgear 1 every 6 months.  Chinstrap 1 every 6 months.  Tubing 1 every 3 months.  Filter(s) Disposable 2 per month.  Filter(s) Non-Disposable 1 every 6 months.  Oral Interface 1 every 3 months. [address] [facility] (Replace) 1 every 6 months.  Tubing with heating element 1 every 3 months.                 Hyacinth Neumann MD, [facility]  Diplomate, American Board of Sleep Medicine  NPI [id number]  Electronically signed 5/12/22       *A copy of compliance data was provided to the patient and reviewed in detail. *CPAP will be  continued at the above pressure settings. The patient is to contact the office if there are problems with either mask or pressure settings. Follow-up will be scheduled at which time compliance data will be reviewed.   * Patient has a history and examination consistent with the diagnosis of sleep apnea. * She was provided information on sleep apnea including corresponding risk factors and the importance of proper treatment. * Treatment options if indicated were reviewed today. Ramon Rene MD, [facility]  Electronically signed 05/12/22        This note was created using voice recognition software. Despite editing, there may be syntax errors. This note will not be viewable in [address]

## 2022-05-12 NOTE — PATIENT INSTRUCTIONS
Sleep Apnea: Care Instructions  Overview     Sleep apnea means that you frequently stop breathing for 10 seconds or longer during sleep. It can be mild to severe, based on the number of times an hour that you stop breathing. Blocked or narrowed airways in your nose, mouth, or throat can cause sleep apnea. Your airway can become blocked when your throat muscles and tongue relax during sleep. You can help treat sleep apnea at home by making lifestyle changes. You also can use a CPAP breathing machine that keeps tissues in the throat from blocking your airway. Or your doctor may suggest that you use a breathing device while you sleep. It helps keep your airway open. This could be a device that you put in your mouth. In some cases, surgery may be needed to remove enlarged tissues in the throat. Follow-up care is a key part of your treatment and safety. Be sure to make and go to all appointments, and call your doctor if you are having problems. It's also a good idea to know your test results and keep a list of the medicines you take. How can you care for yourself at home? · Lose weight, if needed. · Sleep on your side. It may help mild apnea. · Avoid alcohol and medicines such as sleeping pills, opioids, or sedatives before bed. · Don't smoke. If you need help quitting, talk to your doctor. · Prop up the head of your bed. · Treat breathing problems, such as a stuffy nose, that are caused by a cold or allergies. · Try a continuous positive airway pressure (CPAP) breathing machine if your doctor recommends it. · If CPAP doesn't work for you, ask your doctor if you can try other masks, settings, or breathing machines. · Try oral breathing devices or other nasal devices. · Talk to your doctor if your nose feels dry or bleeds, or if it gets runny or stuffy when you use a breathing machine. · Tell your doctor if you're sleepy during the day and it affects your daily life.  Don't drive or operate machinery when you're drowsy. When should you call for help? Watch closely for changes in your health, and be sure to contact your doctor if:    · You still have sleep apnea even though you have made lifestyle changes.     · You are thinking of trying a device such as CPAP.     · You are having problems using a CPAP or similar machine.     · You are still sleepy during the day, and it affects your daily life. Where can you learn more? Go to http://www.gray.com/  Enter J936 in the search box to learn more about \"Sleep Apnea: Care Instructions. \"  Current as of: July 6, 2021               Content Version: 13.2  © 8940-4599 [employer]. Care instructions adapted under license by [employer] (which disclaims liability or warranty for this information). If you have questions about a medical condition or this instruction, always ask your healthcare professional. [place] [address] any warranty or liability for your use of this information.

## 2022-05-18 ENCOUNTER — OFFICE VISIT (OUTPATIENT)
Dept: SLEEP MEDICINE | Age: 57
End: 2022-05-18

## 2022-05-18 DIAGNOSIS — G47.33 OSA (OBSTRUCTIVE SLEEP APNEA): Primary | ICD-10-CM

## 2022-05-18 NOTE — PROGRESS NOTES
Patients ResMed S9 CPAP was reviewed in office . Sd card was formatted and reinserted. SD card is working correctly. Humidifier Stated to be Increasing in temperature. before patient was using.  hared the preheat function with patient. PAtient to follow up as needed.

## (undated) DEVICE — PLEDGET SURG W3/16XL0.25IN THK1.65MM PTFE OVL FELT FOR THE

## (undated) DEVICE — GOWN,AURORA,NON-REINFORCED,2XL: Brand: MEDLINE

## (undated) DEVICE — TOWEL SURG W17XL27IN STD BLU COT NONFENESTRATED PREWASHED

## (undated) DEVICE — SUTURE CHROMIC GUT SZ 3-0 L27IN ABSRB BRN L19MM FS-2 3/8 636H

## (undated) DEVICE — NEEDLE HYPO 25GA L1.5IN BLU POLYPR HUB S STL REG BVL STR

## (undated) DEVICE — APPLICATOR FBR TIP L6IN COT TIP WOOD SHFT SWAB 2000 PER CA

## (undated) DEVICE — SUT PLN 5-0 18IN P3 YEL --

## (undated) DEVICE — SYR 10ML CTRL LR LCK NSAF LF --

## (undated) DEVICE — SURGICAL PROCEDURE PACK BASIN MAJ SET CUST NO CAUT

## (undated) DEVICE — CODMAN® SURGICAL PATTIES 1" X 3" (2.54CM X 7.62CM): Brand: CODMAN®

## (undated) DEVICE — Z DISCONTINUEDSOLUTION PREP 2OZ 10% POVIDONE IOD SCR CAP BTL

## (undated) DEVICE — PACKING 8004008 NEURAY 200PK 25X76MM: Brand: NEURAY ®

## (undated) DEVICE — (D)STRIP SKN CLSR 0.5X4IN WHT --

## (undated) DEVICE — KENDALL SCD EXPRESS SLEEVES, KNEE LENGTH, MEDIUM: Brand: KENDALL SCD

## (undated) DEVICE — BLADE 1882040 5PK INFERIOR TURB 2MM

## (undated) DEVICE — EYE PADSSTERILENOT MADE WITH NATURAL RUBBER LATEXSINGLE USE ONLYDO NOT USE IF PACKAGE OPENED OR DAMAGED: Brand: CARDINAL HEALTH

## (undated) DEVICE — INFECTION CONTROL KIT SYS

## (undated) DEVICE — DEVON™ KNEE AND BODY STRAP 60" X 3" (1.5 M X 7.6 CM): Brand: DEVON

## (undated) DEVICE — PACK,EENT,TURBAN DRAPE,PK II: Brand: MEDLINE

## (undated) DEVICE — INTENDED FOR TISSUE SEPARATION, AND OTHER PROCEDURES THAT REQUIRE A SHARP SURGICAL BLADE TO PUNCTURE OR CUT.: Brand: BARD-PARKER ® CARBON RIB-BACK BLADES

## (undated) DEVICE — 1200 GUARD II KIT W/5MM TUBE W/O VAC TUBE: Brand: GUARDIAN

## (undated) DEVICE — SOLUTION IV 1000ML 0.9% SOD CHL